# Patient Record
Sex: MALE | Race: WHITE | NOT HISPANIC OR LATINO
[De-identification: names, ages, dates, MRNs, and addresses within clinical notes are randomized per-mention and may not be internally consistent; named-entity substitution may affect disease eponyms.]

---

## 2019-10-11 ENCOUNTER — APPOINTMENT (OUTPATIENT)
Dept: ORTHOPEDIC SURGERY | Facility: CLINIC | Age: 54
End: 2019-10-11
Payer: COMMERCIAL

## 2019-10-11 VITALS
DIASTOLIC BLOOD PRESSURE: 95 MMHG | WEIGHT: 260 LBS | BODY MASS INDEX: 36.4 KG/M2 | HEART RATE: 68 BPM | HEIGHT: 71 IN | SYSTOLIC BLOOD PRESSURE: 156 MMHG

## 2019-10-11 PROCEDURE — 99203 OFFICE O/P NEW LOW 30 MIN: CPT

## 2019-10-11 NOTE — DISCUSSION/SUMMARY
[de-identified] : Plan the patient is to continue with the present level activities. He was assured that the x-rays we do see some good results with no evidence of any loosening of the hardware. However due to was complaints. It is recommended that further diagnostic testing is warranted. He was referred for an MRI to evaluate the prosthesis and any surrounding tissue. He is instructed to notify the office once the MRI is performed to discuss his findings and any further medical management.

## 2019-10-11 NOTE — HISTORY OF PRESENT ILLNESS
[de-identified] : Patient is a 54 or a male who presents today for evaluation of his right hip. He is status post right total hip replacement in 2014. He states this is been doing very well with normal activities. However at certain activities particularly when having sex he states that he feels a grinding sensation in his right hip. He states that with this moment he also feels some pain. But normal walking ambulation up and down stairs or any other activities he does not feel this discomfort. He states that it sporadically but when it does occur it occurs for quite period of time. The pain dissipates. He denies any history of injury or accident.

## 2019-10-11 NOTE — PHYSICAL EXAM
[Normal] : Gait: normal [de-identified] : On physical examination of the right hip. Patient is status post right total hip replacement. There is a well-healed surgical scar with no evidence of infection superficial or deep. There is no redness no heat or discharge noted. There is no pain or tenderness on examination. Patient does have good range of motion in all planes without reproducible pain. There is no evidence of length discrepancy. No evidence of muscle atrophy. No evidence of any motor or sensory deficit. Patient does have good distal pulses no calf tenderness. [de-identified] : X-rays of the right hip revealed status post right total hip replacement. Hardware is in place until 6 and landed as well as fixation. There is no evidence of any fracture dislocation or loosening of any hardware.

## 2019-11-04 ENCOUNTER — FORM ENCOUNTER (OUTPATIENT)
Age: 54
End: 2019-11-04

## 2019-11-05 ENCOUNTER — APPOINTMENT (OUTPATIENT)
Dept: MRI IMAGING | Facility: CLINIC | Age: 54
End: 2019-11-05
Payer: COMMERCIAL

## 2019-11-05 ENCOUNTER — OUTPATIENT (OUTPATIENT)
Dept: OUTPATIENT SERVICES | Facility: HOSPITAL | Age: 54
LOS: 1 days | End: 2019-11-05

## 2019-11-05 DIAGNOSIS — Z96.649 PRESENCE OF UNSPECIFIED ARTIFICIAL HIP JOINT: ICD-10-CM

## 2019-11-05 PROCEDURE — 73721 MRI JNT OF LWR EXTRE W/O DYE: CPT | Mod: 26,RT

## 2019-11-07 ENCOUNTER — CHART COPY (OUTPATIENT)
Age: 54
End: 2019-11-07